# Patient Record
(demographics unavailable — no encounter records)

---

## 2025-04-03 NOTE — ASSESSMENT
[FreeTextEntry1] : will observe. bloods drawn. to call next week for results. sonogram next visit.  to return earlier if any change.  patient has been given the opportunity to ask questions, and all of the patient's questions have been answered to their satisfaction

## 2025-04-03 NOTE — HISTORY OF PRESENT ILLNESS
[de-identified] : 10 years s/p right thyroid lobectomy for micro ca. has left thyroid nodule/calcification, 1.4 cm, stable on recent sonogram. denies dysphagia, hoarseness or new lesions. no changes medically since last visit.  feels well on Synthroid 75 mcg daily, I have reviewed all old and new data and available images.

## 2025-04-03 NOTE — PHYSICAL EXAM
[de-identified] : well healed scar [de-identified] : stable 1 cm left thyroid nodule [Laryngoscopy Performed] : laryngoscopy was performed, see procedure section for findings [Midline] : located in midline position [Normal] : orientation to person, place, and time: normal

## 2025-04-03 NOTE — HISTORY REVIEWED
[History reviewed] : History reviewed. [Medications and Allergies reviewed] : Medications and allergies reviewed. no fever and no chills.

## 2025-05-21 NOTE — HISTORY OF PRESENT ILLNESS
[de-identified] : 67-year-old white female presents for complete checkup patient denies chest pain shortness of breath fever chills abdominal pain overall she is feeling well

## 2025-05-21 NOTE — HEALTH RISK ASSESSMENT
[No] : No [No falls in past year] : Patient reported no falls in the past year [0] : 2) Feeling down, depressed, or hopeless: Not at all (0) [PHQ-2 Negative - No further assessment needed] : PHQ-2 Negative - No further assessment needed [Time Spent: ___ Minutes] : I spent [unfilled] minutes performing a depression screening for this patient. [None] : Patient does not have any barriers to medication adherence [Yes] : Reviewed medication list for presence of high-risk medications. [Opioids] : opioids [Never] : Never [NO] : No [With Significant Other] : lives with significant other [] :  [Fully functional (bathing, dressing, toileting, transferring, walking, feeding)] : Fully functional (bathing, dressing, toileting, transferring, walking, feeding) [Fully functional (using the telephone, shopping, preparing meals, housekeeping, doing laundry, using] : Fully functional and needs no help or supervision to perform IADLs (using the telephone, shopping, preparing meals, housekeeping, doing laundry, using transportation, managing medications and managing finances) [Reviewed no changes] : Reviewed, no changes [[id number]] : 0 [Change in mental status noted] : No change in mental status noted [MammogramDate] : 2024 [BoneDensityDate] : 2023 [ColonoscopyDate] : 2023 [ColonoscopyComments] : cologuard neg [de-identified] : retired

## 2025-05-21 NOTE — ASSESSMENT
[FreeTextEntry1] : Diet and exercise Check complete blood work Pap mammogram breast self-exam Bone density colonoscopy ophthalmology vitamin D Depression screen done and reviewed 5 minutes Up-to-date on vaccines Refused Prevnar Hyperlipidemia check blood work and advise Hypothyroidism continue levothyroxine 75 daily follow-up with head and neck specialist for history of thyroid malignancy Follow-up 6 months   [Vaccines Reviewed] : Immunizations reviewed today. Please see immunization details in the vaccine log within the immunization flowsheet.

## 2025-06-27 NOTE — HISTORY OF PRESENT ILLNESS
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  Patient reports that she has had the nevus for most of her life.  Was seen by dermatology recently and a biopsy was done due to concern about the appearance of the lesion.  Denies history of previous atypical lesions denies previous skin cancers + FMH of skin CA. no melanoma PMH: Hypothyroidism secondary to thyroid CA  PSH; C sections